# Patient Record
Sex: FEMALE | Race: WHITE | NOT HISPANIC OR LATINO | Employment: FULL TIME | URBAN - METROPOLITAN AREA
[De-identification: names, ages, dates, MRNs, and addresses within clinical notes are randomized per-mention and may not be internally consistent; named-entity substitution may affect disease eponyms.]

---

## 2018-10-24 ENCOUNTER — OFFICE VISIT CONVERTED (OUTPATIENT)
Dept: SURGERY | Facility: CLINIC | Age: 58
End: 2018-10-24
Attending: NURSE PRACTITIONER

## 2018-10-24 ENCOUNTER — CONVERSION ENCOUNTER (OUTPATIENT)
Dept: SURGERY | Facility: CLINIC | Age: 58
End: 2018-10-24

## 2019-02-01 ENCOUNTER — HOSPITAL ENCOUNTER (OUTPATIENT)
Dept: SURGERY | Facility: HOSPITAL | Age: 59
Setting detail: HOSPITAL OUTPATIENT SURGERY
Discharge: HOME | End: 2019-02-01
Attending: SURGERY

## 2019-07-15 ENCOUNTER — HOSPITAL ENCOUNTER (OUTPATIENT)
Dept: OTHER | Facility: HOSPITAL | Age: 59
Discharge: HOME | End: 2019-07-15

## 2019-07-24 ENCOUNTER — HOSPITAL ENCOUNTER (OUTPATIENT)
Dept: OTHER | Facility: HOSPITAL | Age: 59
Discharge: HOME | End: 2019-07-24

## 2021-03-19 ENCOUNTER — HOSPITAL ENCOUNTER (OUTPATIENT)
Dept: VACCINE CLINIC | Facility: HOSPITAL | Age: 61
Discharge: HOME | End: 2021-03-19
Attending: INTERNAL MEDICINE

## 2021-04-16 ENCOUNTER — HOSPITAL ENCOUNTER (OUTPATIENT)
Dept: VACCINE CLINIC | Facility: HOSPITAL | Age: 61
Discharge: HOME | End: 2021-04-16
Attending: INTERNAL MEDICINE

## 2021-05-16 VITALS — HEIGHT: 63 IN | RESPIRATION RATE: 14 BRPM | BODY MASS INDEX: 18.87 KG/M2 | WEIGHT: 106.5 LBS

## 2021-08-02 PROCEDURE — U0003 INFECTIOUS AGENT DETECTION BY NUCLEIC ACID (DNA OR RNA); SEVERE ACUTE RESPIRATORY SYNDROME CORONAVIRUS 2 (SARS-COV-2) (CORONAVIRUS DISEASE [COVID-19]), AMPLIFIED PROBE TECHNIQUE, MAKING USE OF HIGH THROUGHPUT TECHNOLOGIES AS DESCRIBED BY CMS-2020-01-R: HCPCS | Performed by: NURSE PRACTITIONER

## 2024-05-20 ENCOUNTER — OFFICE VISIT (OUTPATIENT)
Dept: FAMILY MEDICINE CLINIC | Facility: CLINIC | Age: 64
End: 2024-05-20
Payer: COMMERCIAL

## 2024-05-20 ENCOUNTER — HOSPITAL ENCOUNTER (OUTPATIENT)
Dept: GENERAL RADIOLOGY | Facility: HOSPITAL | Age: 64
Discharge: HOME | End: 2024-05-20
Admitting: STUDENT IN AN ORGANIZED HEALTH CARE EDUCATION/TRAINING PROGRAM
Payer: COMMERCIAL

## 2024-05-20 VITALS
OXYGEN SATURATION: 98 % | RESPIRATION RATE: 16 BRPM | SYSTOLIC BLOOD PRESSURE: 124 MMHG | WEIGHT: 110 LBS | TEMPERATURE: 97.7 F | HEART RATE: 71 BPM | HEIGHT: 63 IN | DIASTOLIC BLOOD PRESSURE: 80 MMHG | BODY MASS INDEX: 19.49 KG/M2

## 2024-05-20 DIAGNOSIS — M25.552 LEFT HIP PAIN: Primary | ICD-10-CM

## 2024-05-20 DIAGNOSIS — M25.552 LEFT HIP PAIN: ICD-10-CM

## 2024-05-20 PROCEDURE — 73502 X-RAY EXAM HIP UNI 2-3 VIEWS: CPT

## 2024-05-20 PROCEDURE — 99213 OFFICE O/P EST LOW 20 MIN: CPT | Performed by: STUDENT IN AN ORGANIZED HEALTH CARE EDUCATION/TRAINING PROGRAM

## 2024-05-20 PROCEDURE — 96372 THER/PROPH/DIAG INJ SC/IM: CPT | Performed by: STUDENT IN AN ORGANIZED HEALTH CARE EDUCATION/TRAINING PROGRAM

## 2024-05-20 RX ORDER — KETOROLAC TROMETHAMINE 30 MG/ML
60 INJECTION, SOLUTION INTRAMUSCULAR; INTRAVENOUS ONCE
Status: COMPLETED | OUTPATIENT
Start: 2024-05-20 | End: 2024-05-20

## 2024-05-20 RX ORDER — TRIAMCINOLONE ACETONIDE 40 MG/ML
40 INJECTION, SUSPENSION INTRA-ARTICULAR; INTRAMUSCULAR ONCE
Status: COMPLETED | OUTPATIENT
Start: 2024-05-20 | End: 2024-05-20

## 2024-05-20 RX ORDER — VALACYCLOVIR HYDROCHLORIDE 500 MG/1
1 TABLET, FILM COATED ORAL DAILY
COMMUNITY
Start: 2024-03-17

## 2024-05-20 RX ADMIN — TRIAMCINOLONE ACETONIDE 40 MG: 40 INJECTION, SUSPENSION INTRA-ARTICULAR; INTRAMUSCULAR at 15:19

## 2024-05-20 RX ADMIN — KETOROLAC TROMETHAMINE 60 MG: 30 INJECTION, SOLUTION INTRAMUSCULAR; INTRAVENOUS at 15:17

## 2024-05-20 NOTE — PROGRESS NOTES
"Chief Complaint  Establish Care and Hip Pain (Patient reports left hip pain, it started Thursday 5/16, no particular event just sudden onset of pain. Pain is at a 5/10.)    Subjective      Hip Pain         Romina Hopper is a 64 y.o. female who presents to [facility]   With a past medical history of allergic rhinitis, current smoker has smoked for the past 40 years presents today with her daughter for severe onset left hip pain that began in her left hip on Thursday evening 4 days ago.  She denies any recent injuries falls or recent travel.  She has no other complaints such as nausea or vomiting or fever.      Objective   Vital Signs:   Vitals:    05/20/24 1426   BP: 124/80   BP Location: Left arm   Patient Position: Sitting   Cuff Size: Adult   Pulse: 71   Resp: 16   Temp: 97.7 °F (36.5 °C)   TempSrc: Infrared   SpO2: 98%   Weight: 49.9 kg (110 lb)   Height: 160 cm (63\")   PainSc:   5   PainLoc: Hip     Body mass index is 19.49 kg/m².    Wt Readings from Last 3 Encounters:   05/20/24 49.9 kg (110 lb)   08/02/21 54.4 kg (120 lb)   10/24/18 48.3 kg (106 lb 8 oz)     BP Readings from Last 3 Encounters:   05/20/24 124/80   08/02/21 131/71       Health Maintenance   Topic Date Due    COLORECTAL CANCER SCREENING  Never done    Pneumococcal Vaccine 0-64 (1 of 2 - PCV) Never done    TDAP/TD VACCINES (1 - Tdap) Never done    ZOSTER VACCINE (1 of 2) Never done    MAMMOGRAM  07/15/2021    RSV Vaccine - Adults (1 - 1-dose 60+ series) Never done    COVID-19 Vaccine (4 - 2023-24 season) 09/01/2023    HEPATITIS C SCREENING  Never done    ANNUAL PHYSICAL  Never done    PAP SMEAR  Never done    INFLUENZA VACCINE  08/01/2024       Physical Exam  Constitutional:       Appearance: Normal appearance.   HENT:      Head: Normocephalic.      Nose: Nose normal.      Mouth/Throat:      Mouth: Mucous membranes are moist.   Eyes:      Pupils: Pupils are equal, round, and reactive to light.   Cardiovascular: "      Rate and Rhythm: Normal rate and regular rhythm.   Pulmonary:      Effort: Pulmonary effort is normal.   Abdominal:      General: Abdomen is flat.   Musculoskeletal:         General: Normal range of motion.      Cervical back: Normal range of motion.   Skin:     General: Skin is warm and dry.   Neurological:      General: No focal deficit present.      Mental Status: She is alert.   Psychiatric:         Mood and Affect: Mood normal.         Thought Content: Thought content normal.          Result Review :     The following data was reviewed by: Lily Sargent MD on 05/20/2024:      Procedures          Diagnoses and all orders for this visit:    1. Left hip pain (Primary)  -     XR Hip With or Without Pelvis 2 - 3 View Left; Future  -     ketorolac (TORADOL) injection 60 mg  -     triamcinolone acetonide (KENALOG-40) injection 40 mg  -     diclofenac (VOLTAREN) 50 MG EC tablet; Take 1 tablet by mouth 2 (Two) Times a Day for 30 days.  Dispense: 60 tablet; Refill: 0  -     Diclofenac Sodium (Voltaren) 1 % gel gel; Apply 4 g topically to the appropriate area as directed 4 (Four) Times a Day As Needed (hand pain).  Dispense: 100 g; Refill: 1       Patient has not been to a doctor's office in a while we will go ahead and order x-rays today we will see patient back in 2 weeks for follow-up.  Heart diclofenac 50 mg tablets do not take ibuprofen with this patient understands not to take NSAIDs will start them tomorrow since she is getting Toradol today.  BMI is within normal parameters. No other follow-up for BMI required.         FOLLOW UP  Return in about 4 weeks (around 6/17/2024).  Patient was given instructions and counseling regarding her condition or for health maintenance advice. Please see specific information pulled into the AVS if appropriate.       Lily Sargent MD  05/20/24  15:27 EDT    CURRENT & DISCONTINUED MEDICATIONS  Current Outpatient Medications   Medication Instructions    diclofenac (VOLTAREN) 50  mg, Oral, 2 Times Daily    Diclofenac Sodium (VOLTAREN) 4 g, Topical, 4 Times Daily PRN    loratadine (Claritin) 10 MG tablet Claritin 10 mg oral tablet take 1 tablet (10 mg) by oral route once daily   Active    valACYclovir (VALTREX) 500 MG tablet 1 tablet, Oral, Daily       There are no discontinued medications.

## 2024-05-23 ENCOUNTER — TELEPHONE (OUTPATIENT)
Dept: FAMILY MEDICINE CLINIC | Facility: CLINIC | Age: 64
End: 2024-05-23

## 2024-05-23 NOTE — TELEPHONE ENCOUNTER
Caller: Romina Hopper    Relationship to patient: Self    Best call back number:    [phone] (Home)       Patient is needing: PATIENT CALLED IN AND IS REQUESTING A CALL BACK FROM DR. BEATTY. PATIENT SAID MEDICATION SHE WAS PRESCRIBED IS NOT WORKING AND SHE IS REQUESTING A CALL BACK WITH GUIDANCE. PATIENT LAST SEEN ON 5/20/24

## 2024-06-11 ENCOUNTER — LAB (OUTPATIENT)
Dept: LAB | Facility: HOSPITAL | Age: 64
End: 2024-06-11
Payer: COMMERCIAL

## 2024-06-11 ENCOUNTER — OFFICE VISIT (OUTPATIENT)
Dept: FAMILY MEDICINE CLINIC | Facility: CLINIC | Age: 64
End: 2024-06-11
Payer: COMMERCIAL

## 2024-06-11 ENCOUNTER — TRANSCRIBE ORDERS (OUTPATIENT)
Dept: ADMINISTRATIVE | Facility: HOSPITAL | Age: 64
End: 2024-06-11
Payer: COMMERCIAL

## 2024-06-11 VITALS
OXYGEN SATURATION: 99 % | DIASTOLIC BLOOD PRESSURE: 82 MMHG | SYSTOLIC BLOOD PRESSURE: 137 MMHG | HEART RATE: 76 BPM | BODY MASS INDEX: 19.14 KG/M2 | WEIGHT: 108 LBS | HEIGHT: 63 IN

## 2024-06-11 DIAGNOSIS — Z12.2 ENCOUNTER FOR SCREENING FOR LUNG CANCER: ICD-10-CM

## 2024-06-11 DIAGNOSIS — Z12.31 SCREENING MAMMOGRAM FOR BREAST CANCER: ICD-10-CM

## 2024-06-11 DIAGNOSIS — Z12.31 BREAST CANCER SCREENING BY MAMMOGRAM: Primary | ICD-10-CM

## 2024-06-11 DIAGNOSIS — M25.552 GREATER TROCHANTERIC PAIN SYNDROME OF LEFT LOWER EXTREMITY: ICD-10-CM

## 2024-06-11 DIAGNOSIS — Z12.11 COLON CANCER SCREENING: ICD-10-CM

## 2024-06-11 DIAGNOSIS — Z00.00 ENCOUNTER FOR MEDICAL EXAMINATION TO ESTABLISH CARE: Primary | ICD-10-CM

## 2024-06-11 DIAGNOSIS — B00.1 HERPES LABIALIS: ICD-10-CM

## 2024-06-11 DIAGNOSIS — Z00.00 ENCOUNTER FOR MEDICAL EXAMINATION TO ESTABLISH CARE: ICD-10-CM

## 2024-06-11 LAB
ALBUMIN SERPL-MCNC: 4.2 G/DL (ref 3.5–5.2)
ALBUMIN/GLOB SERPL: 1.8 G/DL
ALP SERPL-CCNC: 61 U/L (ref 39–117)
ALT SERPL W P-5'-P-CCNC: 13 U/L (ref 1–33)
ANION GAP SERPL CALCULATED.3IONS-SCNC: 8.7 MMOL/L (ref 5–15)
AST SERPL-CCNC: 15 U/L (ref 1–32)
BILIRUB SERPL-MCNC: 0.2 MG/DL (ref 0–1.2)
BUN SERPL-MCNC: 21 MG/DL (ref 8–23)
BUN/CREAT SERPL: 30.4 (ref 7–25)
CALCIUM SPEC-SCNC: 9.7 MG/DL (ref 8.6–10.5)
CHLORIDE SERPL-SCNC: 107 MMOL/L (ref 98–107)
CHOLEST SERPL-MCNC: 162 MG/DL (ref 0–200)
CO2 SERPL-SCNC: 25.3 MMOL/L (ref 22–29)
CREAT SERPL-MCNC: 0.69 MG/DL (ref 0.57–1)
DEPRECATED RDW RBC AUTO: 44.5 FL (ref 37–54)
EGFRCR SERPLBLD CKD-EPI 2021: 98.3 ML/MIN/1.73
ERYTHROCYTE [DISTWIDTH] IN BLOOD BY AUTOMATED COUNT: 12.3 % (ref 12.3–15.4)
GLOBULIN UR ELPH-MCNC: 2.4 GM/DL
GLUCOSE SERPL-MCNC: 95 MG/DL (ref 65–99)
HCT VFR BLD AUTO: 39.6 % (ref 34–46.6)
HCV AB SER QL: NORMAL
HDLC SERPL-MCNC: 60 MG/DL (ref 40–60)
HGB BLD-MCNC: 13.6 G/DL (ref 12–15.9)
LDLC SERPL CALC-MCNC: 87 MG/DL (ref 0–100)
LDLC/HDLC SERPL: 1.43 {RATIO}
MCH RBC QN AUTO: 34.2 PG (ref 26.6–33)
MCHC RBC AUTO-ENTMCNC: 34.3 G/DL (ref 31.5–35.7)
MCV RBC AUTO: 99.5 FL (ref 79–97)
PLATELET # BLD AUTO: 238 10*3/MM3 (ref 140–450)
PMV BLD AUTO: 9.2 FL (ref 6–12)
POTASSIUM SERPL-SCNC: 4.2 MMOL/L (ref 3.5–5.2)
PROT SERPL-MCNC: 6.6 G/DL (ref 6–8.5)
RBC # BLD AUTO: 3.98 10*6/MM3 (ref 3.77–5.28)
SODIUM SERPL-SCNC: 141 MMOL/L (ref 136–145)
TRIGL SERPL-MCNC: 81 MG/DL (ref 0–150)
VLDLC SERPL-MCNC: 15 MG/DL (ref 5–40)
WBC NRBC COR # BLD AUTO: 5.42 10*3/MM3 (ref 3.4–10.8)

## 2024-06-11 PROCEDURE — 99214 OFFICE O/P EST MOD 30 MIN: CPT | Performed by: STUDENT IN AN ORGANIZED HEALTH CARE EDUCATION/TRAINING PROGRAM

## 2024-06-11 PROCEDURE — 80061 LIPID PANEL: CPT

## 2024-06-11 PROCEDURE — 85027 COMPLETE CBC AUTOMATED: CPT

## 2024-06-11 PROCEDURE — 36415 COLL VENOUS BLD VENIPUNCTURE: CPT

## 2024-06-11 PROCEDURE — 80053 COMPREHEN METABOLIC PANEL: CPT

## 2024-06-11 PROCEDURE — 86803 HEPATITIS C AB TEST: CPT

## 2024-06-11 RX ORDER — BACILLUS COAGULANS/INULIN 1B-250 MG
50 CAPSULE ORAL DAILY
COMMUNITY

## 2024-06-11 RX ORDER — VALACYCLOVIR HYDROCHLORIDE 500 MG/1
500 TABLET, FILM COATED ORAL DAILY
Qty: 90 TABLET | Refills: 1 | Status: SHIPPED | OUTPATIENT
Start: 2024-06-11

## 2024-06-11 RX ORDER — MELOXICAM 15 MG/1
15 TABLET ORAL DAILY
Qty: 30 TABLET | Refills: 0 | Status: SHIPPED | OUTPATIENT
Start: 2024-06-11

## 2024-06-11 NOTE — PROGRESS NOTES
Subjective:       Romina Hopper is a 64 y.o. female with a concurrent medical history of current everyday smoking, allergic rhinitis, and herpes labialis who presents for to establish care.     Ms. Hopper is currently a patient of PCP Dr. Cha.  Their first and only visit was this a few weeks ago, on 5/20/2024.  According to that note, she had not been to a physician's office in some time.      At her recent 5/20/2024 visit with Dr. Cha, Ms. Hopper reported left hip pain that started approximately 4 days prior to that appointment.  Per note there were no inciting injuries.  I have reviewed that office note.  X-rays of the hip were obtained and demonstrated mild bilateral hip osteoarthritis.        She was given a Toradol and Kenalog injection and started on Voltaren and Voltaren gel. She was scheduled for 4-week follow-up.    Today, patient tells me she continues to have pain but it has somewhat improved.  She has been using ibuprofen rather than the Voltaren that was previously prescribed.  She requests further workup.    We discussed that the initial approach to the evaluation of hip pain is based on localization.  Her pain is more lateral in nature it was not associated with injury.  She does have some pain sleeping on the affected hip.  Sitting for long periods of time and physical activity can aggravate her pain.  On physical exam, she does not have any restriction by pain of range of motion of the affected hip.  She does have potentially some slight tenderness to palpation over the lateral hip and greater trochanter.    Symptoms for me are more consistent with greater trochanteric pain syndrome.  Etiology for this can include conditions such as gluteus medius tendinopathy or muscle tear or bursitis.  Less likely to be bursitis based on her current history and symptoms.    I would have her stop ibuprofen and Voltaren (she is not taking Voltaren currently) and start her on Mobic.  I would set  her up for physical therapy and obtain MRI of the affected hip to assess for gluteus medius tear.  Because the symptoms do bother her significantly, I will go ahead and refer her to orthopedic surgery as current evidence demonstrates that in patients with labral tears, gluteus medius tendon tears there are usually good surgical outcomes and thus on this basis early referral has been shown to improve patient outcomes.      The following portions of the patient's history were reviewed and updated as appropriate: allergies, current medications, past family history, past medical history, past social history, past surgical history, and problem list.    Past Medical Hx:  Past Medical History:   Diagnosis Date    Allergic 09/1997       Past Surgical Hx:  Past Surgical History:   Procedure Laterality Date    TUBAL ABDOMINAL LIGATION         Current Meds:    Current Outpatient Medications:     Bacillus Coagulans-Inulin (Probiotic) 1-250 BILLION-MG capsule, Take 50 Billion Cells by mouth Daily., Disp: , Rfl:     Diclofenac Sodium (Voltaren) 1 % gel gel, Apply 4 g topically to the appropriate area as directed 4 (Four) Times a Day As Needed (hand pain)., Disp: 100 g, Rfl: 1    loratadine (Claritin) 10 MG tablet, Claritin 10 mg oral tablet take 1 tablet (10 mg) by oral route once daily   Active, Disp: , Rfl:     valACYclovir (VALTREX) 500 MG tablet, Take 1 tablet by mouth Daily., Disp: 90 tablet, Rfl: 1    meloxicam (Mobic) 15 MG tablet, Take 1 tablet by mouth Daily., Disp: 30 tablet, Rfl: 0    Allergies:  Allergies   Allergen Reactions    Hydrocodone GI Intolerance and Nausea And Vomiting       Family Hx:  Family History   Problem Relation Age of Onset    Arthritis Mother     Diabetes Mother     Hyperlipidemia Mother     Heart disease Father     Hyperlipidemia Father     Diabetes Maternal Grandfather     Stroke Maternal Grandfather     Diabetes Maternal Grandmother     Cancer Brother     Cancer Paternal Aunt     COPD Sister      "    Social History:  Social History     Socioeconomic History    Marital status:    Tobacco Use    Smoking status: Every Day     Current packs/day: 1.00     Average packs/day: 1 pack/day for 50.0 years (50.0 ttl pk-yrs)     Types: Cigarettes    Smokeless tobacco: Never   Vaping Use    Vaping status: Never Used   Substance and Sexual Activity    Alcohol use: Not Currently     Alcohol/week: 1.0 standard drink of alcohol     Types: 1 Glasses of wine per week     Comment: Haven’t really drank alcohol since about 2020    Drug use: Never    Sexual activity: Not Currently     Partners: Male     Birth control/protection: Tubal ligation       Review of Systems  Review of Systems   Musculoskeletal:  Positive for arthralgias (left hip pain).       Objective:     /82   Pulse 76   Ht 160 cm (63\")   Wt 49 kg (108 lb)   SpO2 99%   BMI 19.13 kg/m²   Physical Exam  Constitutional:       General: She is not in acute distress.     Appearance: Normal appearance. She is normal weight. She is not ill-appearing, toxic-appearing or diaphoretic.   Pulmonary:      Effort: Pulmonary effort is normal. No respiratory distress.   Musculoskeletal:      Comments: Details of musculoskeletal exam of left hip included in body of note   Neurological:      Mental Status: She is alert.   Psychiatric:         Mood and Affect: Mood normal.         Behavior: Behavior normal.          Assessment/Plan:     Diagnoses and all orders for this visit:    1. Encounter for medical examination to establish care (Primary)  -     Lipid panel; Future  -     CBC No Differential; Future  -     Hepatitis C antibody; Future  -     Comprehensive metabolic panel; Future    2. Greater trochanteric pain syndrome of left lower extremity      Ms. Hopper is currently a patient of PCP Dr. Cha.  Their first and only visit was this a few weeks ago, on 5/20/2024.  According to that note, she had not been to a physician's office in some time.      At her " recent 5/20/2024 visit with Dr. Cha, Ms. Hopper reported left hip pain that started approximately 4 days prior to that appointment.  Per note there were no inciting injuries.  I have reviewed that office note.  X-rays of the hip were obtained and demonstrated mild bilateral hip osteoarthritis.        She was given a Toradol and Kenalog injection and started on Voltaren and Voltaren gel. She was scheduled for 4-week follow-up.    Today, patient tells me she continues to have pain but it has somewhat improved.  She has been using ibuprofen rather than the Voltaren that was previously prescribed.  She requests further workup.    We discussed that the initial approach to the evaluation of hip pain is based on localization.  Her pain is more lateral in nature it was not associated with injury.  She does have some pain sleeping on the affected hip.  Sitting for long periods of time and physical activity can aggravate her pain.  On physical exam, she does not have any restriction by pain of range of motion of the affected hip.  She does have potentially some slight tenderness to palpation over the lateral hip and greater trochanter.    Symptoms for me are more consistent with greater trochanteric pain syndrome.  Etiology for this can include conditions such as gluteus medius tendinopathy or muscle tear or bursitis.  Less likely to be bursitis based on her current history and symptoms.    I would have her stop ibuprofen and Voltaren (she is not taking Voltaren currently) and start her on Mobic.  I would set her up for physical therapy and obtain MRI of the affected hip to assess for gluteus medius tear.  Because the symptoms do bother her significantly, I will go ahead and refer her to orthopedic surgery as current evidence demonstrates that in patients with labral tears, gluteus medius tendon tears there are usually good surgical outcomes and thus on this basis early referral has been shown to improve patient  outcomes.      -     MRI Hip Right Without Contrast; Future  -     Ambulatory Referral to Orthopedic Surgery  -     meloxicam (Mobic) 15 MG tablet; Take 1 tablet by mouth Daily.  Dispense: 30 tablet; Refill: 0  -     Ambulatory Referral to Physical Therapy    3. Colon cancer screening    Care gaps in chart of colon cancer screening.  Will order Cologuard for her today.    -     Cologuard - Stool, Per Rectum; Future    4. Screening mammogram for breast cancer    She is overdue for a screening mammogram.  She did tell me that she has been told in the past she has dense breasts and would need 3D imaging.  I have put specific instructions to the imaging center to let me know if they require additional orders for me.    -     Mammo Screening Bilateral With CAD; Future    5. Encounter for screening for lung cancer      She smokes 1 pack of cigarettes a day and has smoked for 50 years.  She would be indicated for a lung cancer CT screening.  She agrees to receive this today.  I have ordered this for her.  I did recommend cessation but it appears to me in my judgment she is in the precontemplation stage.      -     CT Chest Low Dose Wo; Future    6. Herpes labialis    She takes daily Valtrex for suppression of herpes labialis.  Will refill at her request but do want to recheck renal function with CMP and I have ordered this.    -     valACYclovir (VALTREX) 500 MG tablet; Take 1 tablet by mouth Daily.  Dispense: 90 tablet; Refill: 1          Rx changes: Discontinuing existing NSAIDs and sending in Mobic    Follow-up:     Return in about 6 weeks (around 7/23/2024) for Pap With Sherie .    Preventative:  Health Maintenance   Topic Date Due    COLORECTAL CANCER SCREENING  Never done    LUNG CANCER SCREENING  Never done    MAMMOGRAM  07/15/2021    HEPATITIS C SCREENING  Never done    PAP SMEAR  Never done    COVID-19 Vaccine (4 - 2023-24 season) 06/12/2024 (Originally 9/1/2023)    RSV Vaccine - Adults (1 - 1-dose 60+ series)  06/12/2024 (Originally 1/9/2022)    Pneumococcal Vaccine 0-64 (1 of 2 - PCV) 06/12/2024 (Originally [birth year]9/1968)    TDAP/TD VACCINES (1 - Tdap) 06/12/2024 (Originally 1/9/1981)    ZOSTER VACCINE (1 of 2) 06/12/2024 (Originally 1/9/2012)    INFLUENZA VACCINE  08/01/2024    ANNUAL PHYSICAL  06/11/2025       This document has been electronically signed by Jose F Villarreal MD on June 11, 2024 11:06 EDT       Parts of this note are electronic transcriptions/translations of spoken language to printed text using the Dragon Dictation system.

## 2024-06-12 ENCOUNTER — TELEPHONE (OUTPATIENT)
Dept: FAMILY MEDICINE CLINIC | Facility: CLINIC | Age: 64
End: 2024-06-12
Payer: COMMERCIAL

## 2024-06-12 NOTE — TELEPHONE ENCOUNTER
Caller: Romina Hopper    Relationship to patient: Self    Best call back number: [phone]    Chief complaint: PATIENT IS NEEDING PAP SMEAR ONLY WITH [facility]     Type of visit: PHYSICAL WITH PAP     Requested date: A MORNING

## 2024-06-12 NOTE — TELEPHONE ENCOUNTER
HUB TO RELAY AND SCHEDULE      1ST ATTEMPT TO CONTACT. Called patient to schedule a physical with pap. No answer, left vm

## 2024-06-12 NOTE — TELEPHONE ENCOUNTER
Caller: WardRomina    Relationship: Self    Best call back number: [phone]    Additional notes: THIS MRI ORDER WAS FOR THE RIGHT HIP, WHEN IT SHOULD BE THE LEFT HIP. SHE WOULD LIKE A CALL WHEN CORRECTED

## 2024-06-13 ENCOUNTER — OFFICE VISIT (OUTPATIENT)
Dept: ORTHOPEDIC SURGERY | Facility: CLINIC | Age: 64
End: 2024-06-13
Payer: COMMERCIAL

## 2024-06-13 VITALS
OXYGEN SATURATION: 96 % | DIASTOLIC BLOOD PRESSURE: 81 MMHG | SYSTOLIC BLOOD PRESSURE: 126 MMHG | HEART RATE: 73 BPM | HEIGHT: 63 IN | WEIGHT: 108 LBS | BODY MASS INDEX: 19.14 KG/M2

## 2024-06-13 DIAGNOSIS — M25.552 LEFT HIP PAIN: Primary | ICD-10-CM

## 2024-06-13 DIAGNOSIS — M25.552 GREATER TROCHANTERIC PAIN SYNDROME OF LEFT LOWER EXTREMITY: ICD-10-CM

## 2024-06-13 DIAGNOSIS — Z91.89 AT RISK FOR HEART DISEASE: ICD-10-CM

## 2024-06-13 DIAGNOSIS — M70.62 TROCHANTERIC BURSITIS OF LEFT HIP: ICD-10-CM

## 2024-06-13 DIAGNOSIS — M62.838 NIGHT MUSCLE SPASMS: Primary | ICD-10-CM

## 2024-06-13 RX ORDER — ATORVASTATIN CALCIUM 10 MG/1
10 TABLET, FILM COATED ORAL DAILY
Qty: 90 TABLET | Refills: 1 | Status: SHIPPED | OUTPATIENT
Start: 2024-06-13

## 2024-06-13 RX ORDER — TIZANIDINE 2 MG/1
2 TABLET ORAL NIGHTLY PRN
Qty: 30 TABLET | Refills: 1 | Status: SHIPPED | OUTPATIENT
Start: 2024-06-13

## 2024-06-13 NOTE — PROGRESS NOTES
I have reviewed Ms. Guaman's lab work.  CMP shows normal liver and renal function and electrolytes.  CBC shows normal white blood cells, red blood cells, and platelets.  Hepatitis C is nonreactive.  Total cholesterol, LDL cholesterol, and HDL cholesterol are all normal.  However, because she is a smoker, guidelines would still recommend starting her on a statin to lower her risk of stroke and heart attack if she is agreeable.  Can we assess her for this?  Common side effects of statins include lower extremity muscle aches.  With her having hip pain, it would be reasonable for her to defer starting treatment for now. Sometimes patients will have some elevation in liver enzymes but again her liver enzymes are normal.    Thank you,    Jose F Villarreal    ?  This document has been electronically signed by Jose F Villarreal MD on June 13, 2024 09:09 EDT

## 2024-06-13 NOTE — PROGRESS NOTES
"Chief Complaint  Initial Evaluation of the Left Hip     Subjective      Romina Hopper presents to [facility] for initial evaluation of the left hip. She has had for about a month on the lateral aspect of the hip and the upper thigh.  She was given steroid and Toradol injection that gave no relief.  She was put on anti inflammatory that gave little relief.  She had no injury or fall.  She has pain with transition from sit to stand, stairs and prolonged standing.  She took a week off work and rested and notes she is feeling better.     Allergies   Allergen Reactions    Hydrocodone GI Intolerance and Nausea And Vomiting        Social History     Socioeconomic History    Marital status:    Tobacco Use    Smoking status: Every Day     Current packs/day: 1.00     Average packs/day: 1 pack/day for 50.4 years (50.4 ttl pk-yrs)     Types: Cigarettes     Start date: 1974    Smokeless tobacco: Never   Vaping Use    Vaping status: Never Used   Substance and Sexual Activity    Alcohol use: Not Currently     Alcohol/week: 1.0 standard drink of alcohol     Types: 1 Glasses of wine per week     Comment: Haven’t really drank alcohol since about 2020    Drug use: Never    Sexual activity: Not Currently     Partners: Male     Birth control/protection: Tubal ligation        I reviewed the patient's chief complaint, history of present illness, review of systems, past medical history, surgical history, family history, social history, medications, and allergy list.     Review of Systems     Constitutional: Denies fevers, chills, weight loss  Cardiovascular: Denies chest pain, shortness of breath  Skin: Denies rashes, acute skin changes  Neurologic: Denies headache, loss of consciousness        Vital Signs:   /81   Pulse 73   Ht 160 cm (62.99\")   Wt 49 kg (108 lb)   SpO2 96%   BMI 19.14 kg/m²          Physical Exam  General: Alert. No acute distress    Ortho Exam        LEFT HIP Hip No " skin discoloration, atrophy or swelling. Positive EHL, FHL, GS, and TA. Sensation intact to all 5 nerves of the foot. Negative straight leg raise. Leg lengths appear equal. Ambulates with Non-antalgic gait Negative Real. Negative Valentino. Good strength to hamstrings, quadriceps, dorsiflexors, and plantar flexors. Knee Extensor Mechanism  intact        Procedures      Imaging Results (Most Recent)       None             Result Review :       XR Hip With or Without Pelvis 2 - 3 View Left    Result Date: 5/21/2024  Narrative: XR HIP W OR WO PELVIS 2-3 VIEW LEFT-  Date of Exam: 5/20/2024 4:19 PM  Indication: lef thip pain; M25.552-Pain in left hip  Comparison: None available.  Findings: No fracture or malalignment is identified. Mild bilateral hip osteoarthritis is noted. Soft tissues appear normal.      Impression: Impression: 1.  Mild bilateral hip osteoarthritis   Electronically Signed By-Lokesh Thomas MD On:5/21/2024 7:28 AM              Assessment and Plan     Diagnoses and all orders for this visit:    1. Left hip pain (Primary)    2. Trochanteric bursitis of left hip        Discussed the treatment plan with the patient. I reviewed the X-rays that were obtained 5/21/24 with the patient.     Continue anti inflammatory.  Given HEP exercises.     Educated on risk of smoking/nicotine. Discussed options for smoking cessation. and Call or return if worsening symptoms.    Follow Up     PRN      Patient was given instructions and counseling regarding her condition or for health maintenance advice. Please see specific information pulled into the AVS if appropriate.     Scribed for Holly Avila MD by Kathryn Villafana MA.  06/13/24   11:05 EDT    I have personally performed the services described in this document as scribed by the above individual and it is both accurate and complete. Holly Avila MD 06/13/24

## 2024-06-13 NOTE — TELEPHONE ENCOUNTER
Please let patient know that the correct order has been placed, and can we cancel the other MRI order?    Thank you,    Jose F Villarreal      This document has been electronically signed by Jose F Villarreal MD on June 13, 2024 08:53 EDT

## 2024-06-13 NOTE — PROGRESS NOTES
Very well.  In that case, I have sent in a prescription for Lipitor 10 mg.    I have also sent in a very low-dose muscle relaxer of Zanaflex 2 mg.  Please let the patient know that this can make her sleepy and so she should not take this medication before operating any heavy machinery or driving.  Please also let her know that Valtrex that she takes can increase the dose of Zanaflex in her system so I am sending her in the very lowest dose possible to minimize that risk but if she feels oversedated to please let me know and I will discontinue that medication.    Thank you,    Jose F Villarreal    ?  This document has been electronically signed by Jose F Villarreal MD on June 13, 2024 11:22 EDT

## 2024-07-08 DIAGNOSIS — M25.552 GREATER TROCHANTERIC PAIN SYNDROME OF LEFT LOWER EXTREMITY: ICD-10-CM

## 2024-07-08 RX ORDER — MELOXICAM 15 MG/1
15 TABLET ORAL DAILY
Qty: 30 TABLET | Refills: 0 | Status: SHIPPED | OUTPATIENT
Start: 2024-07-08

## 2024-07-09 DIAGNOSIS — M62.838 NIGHT MUSCLE SPASMS: ICD-10-CM

## 2024-07-09 RX ORDER — TIZANIDINE 2 MG/1
2 TABLET ORAL NIGHTLY PRN
Qty: 30 TABLET | Refills: 1 | Status: SHIPPED | OUTPATIENT
Start: 2024-07-09

## 2024-07-19 ENCOUNTER — HOSPITAL ENCOUNTER (OUTPATIENT)
Dept: MAMMOGRAPHY | Facility: HOSPITAL | Age: 64
Discharge: HOME | End: 2024-07-19
Payer: COMMERCIAL

## 2024-07-19 ENCOUNTER — HOSPITAL ENCOUNTER (OUTPATIENT)
Dept: CT IMAGING | Facility: HOSPITAL | Age: 64
Discharge: HOME | End: 2024-07-19
Payer: COMMERCIAL

## 2024-07-19 DIAGNOSIS — Z12.31 BREAST CANCER SCREENING BY MAMMOGRAM: ICD-10-CM

## 2024-07-19 DIAGNOSIS — Z12.2 ENCOUNTER FOR SCREENING FOR LUNG CANCER: ICD-10-CM

## 2024-07-19 PROCEDURE — 71271 CT THORAX LUNG CANCER SCR C-: CPT

## 2024-07-19 PROCEDURE — 77067 SCR MAMMO BI INCL CAD: CPT

## 2024-07-19 PROCEDURE — 77063 BREAST TOMOSYNTHESIS BI: CPT

## 2024-07-22 ENCOUNTER — TELEPHONE (OUTPATIENT)
Dept: FAMILY MEDICINE CLINIC | Facility: CLINIC | Age: 64
End: 2024-07-22
Payer: COMMERCIAL

## 2024-07-22 NOTE — PROGRESS NOTES
Screening mammogram negative. Can we let her know?    Thank you,    Jose F Villarreal     ?  This document has been electronically signed by Jose F Villarreal MD on July 22, 2024 13:48 EDT

## 2024-07-22 NOTE — TELEPHONE ENCOUNTER
OK FOR HUB TO RELAY    Called pt, [email or web address].    Screening mammogram negative. Can we let her know?    Thank you,    Jose F iVllarreal     ?  This document has been electronically signed by Jose F Villarreal MD on July 22, 2024 13:48 EDT

## 2024-07-23 ENCOUNTER — TELEPHONE (OUTPATIENT)
Dept: FAMILY MEDICINE CLINIC | Facility: CLINIC | Age: 64
End: 2024-07-23
Payer: COMMERCIAL

## 2024-07-23 ENCOUNTER — OFFICE VISIT (OUTPATIENT)
Dept: FAMILY MEDICINE CLINIC | Facility: CLINIC | Age: 64
End: 2024-07-23
Payer: COMMERCIAL

## 2024-07-23 VITALS
DIASTOLIC BLOOD PRESSURE: 75 MMHG | HEIGHT: 63 IN | SYSTOLIC BLOOD PRESSURE: 118 MMHG | HEART RATE: 71 BPM | BODY MASS INDEX: 20.41 KG/M2 | WEIGHT: 115.2 LBS

## 2024-07-23 DIAGNOSIS — Z23 NEED FOR PNEUMOCOCCAL VACCINATION: ICD-10-CM

## 2024-07-23 DIAGNOSIS — Z01.419 WELL WOMAN EXAM WITH ROUTINE GYNECOLOGICAL EXAM: Primary | ICD-10-CM

## 2024-07-23 DIAGNOSIS — R31.9 HEMATURIA, UNSPECIFIED TYPE: ICD-10-CM

## 2024-07-23 DIAGNOSIS — Z23 NEED FOR SHINGLES VACCINE: ICD-10-CM

## 2024-07-23 LAB
BILIRUB BLD-MCNC: NEGATIVE MG/DL
CLARITY, POC: CLEAR
COLOR UR: ABNORMAL
EXPIRATION DATE: ABNORMAL
GLUCOSE UR STRIP-MCNC: NEGATIVE MG/DL
KETONES UR QL: NEGATIVE
LEUKOCYTE EST, POC: NEGATIVE
Lab: ABNORMAL
NITRITE UR-MCNC: NEGATIVE MG/ML
PH UR: 6 [PH] (ref 5–8)
PROT UR STRIP-MCNC: NEGATIVE MG/DL
RBC # UR STRIP: ABNORMAL /UL
SP GR UR: 1.03 (ref 1–1.03)
UROBILINOGEN UR QL: ABNORMAL

## 2024-07-23 PROCEDURE — 90471 IMMUNIZATION ADMIN: CPT

## 2024-07-23 PROCEDURE — 99396 PREV VISIT EST AGE 40-64: CPT

## 2024-07-23 PROCEDURE — 87624 HPV HI-RISK TYP POOLED RSLT: CPT

## 2024-07-23 PROCEDURE — G0123 SCREEN CERV/VAG THIN LAYER: HCPCS

## 2024-07-23 PROCEDURE — 90750 HZV VACC RECOMBINANT IM: CPT

## 2024-07-23 PROCEDURE — 90677 PCV20 VACCINE IM: CPT

## 2024-07-23 PROCEDURE — 81003 URINALYSIS AUTO W/O SCOPE: CPT

## 2024-07-23 PROCEDURE — 90472 IMMUNIZATION ADMIN EACH ADD: CPT

## 2024-07-23 NOTE — PROGRESS NOTES
Can we let patient know lung cancer CT screen was negative?    Thank you,    Jose F Villarreal    ?  This document has been electronically signed by Jose F Villarreal MD on July 22, 2024 20:50 EDT

## 2024-07-23 NOTE — PROGRESS NOTES
"Subjective   History of Present Illness    Romina Hopper is a 64 y.o. female who presents for annual well woman exam with PAP. She is post menopausal. Her mammograms UTD. She reports a hx of abnormal PAP but unsure of when, states she has had normal PAPs since then. She denies any family     Obstetric History:  OB History          4    Para   4    Term   4            AB        Living             SAB        IAB        Ectopic        Molar        Multiple        Live Births                   Menstrual History:  Menarche Age: 16 years  No LMP recorded. Patient is postmenopausal.  Period Pattern: Regular  Menstrual Flow: Moderate  Menstrual Control: Maxi pad    Sexual History:         No results found for: \"HPVAPTIMA\"    Current contraception: tubal ligation  History of abnormal Pap smear: yes - unsure  ANJELICA exposure in utero: no  Received Gardasil immunization: no  Perform regular self breast exam: no  Family history of uterine or ovarian cancer: no  Family History of cervical cancer: no  Family History of colon cancer/colon polyps: no  Regular self breast exam: no  History of abnormal mammogram: no  Family history of breast cancer: no  History of abnormal lipids: yes - current    The following portions of the patient's history were reviewed and updated as appropriate: allergies, current medications, past family history, past medical history, past social history, past surgical history, and problem list.    Review of Systems    Pertinent items are noted in HPI.     Objective   Physical Exam  Vitals reviewed. Exam conducted with a chaperone present.   Constitutional:       General: She is awake. She is not in acute distress.     Appearance: Normal appearance. She is normal weight. She is not ill-appearing.   HENT:      Head: Normocephalic and atraumatic.   Eyes:      Conjunctiva/sclera: Conjunctivae normal.   Cardiovascular:      Rate and Rhythm: Normal rate and regular rhythm.      Heart sounds: Normal " "heart sounds, S1 normal and S2 normal.   Pulmonary:      Effort: Pulmonary effort is normal.      Breath sounds: Normal breath sounds.   Chest:   Breasts:     Right: Normal. No swelling, mass, nipple discharge, skin change or tenderness.      Left: Normal. No swelling, mass, nipple discharge, skin change or tenderness.   Abdominal:      General: Abdomen is flat. Bowel sounds are normal.      Palpations: Abdomen is soft.      Tenderness: There is no abdominal tenderness.   Genitourinary:     General: Normal vulva.      Exam position: Lithotomy position.      Pubic Area: No rash.       Labia:         Right: No rash, tenderness or lesion.         Left: No rash, tenderness or lesion.       Urethra: No urethral pain, urethral swelling or urethral lesion.      Vagina: Normal. No vaginal discharge.      Cervix: Normal.      Uterus: Normal.       Adnexa: Right adnexa normal and left adnexa normal.      Rectum: Normal.   Musculoskeletal:         General: Normal range of motion.      Cervical back: Normal range of motion.      Right lower leg: No edema.      Left lower leg: No edema.   Skin:     General: Skin is warm.      Findings: No lesion or rash.   Neurological:      General: No focal deficit present.      Mental Status: She is alert and oriented to person, place, and time. Mental status is at baseline.      Motor: Motor function is intact.      Coordination: Coordination is intact.      Gait: Gait is intact.   Psychiatric:         Attention and Perception: Attention and perception normal.         Mood and Affect: Mood and affect normal.         Speech: Speech normal.         Behavior: Behavior normal. Behavior is cooperative.         Thought Content: Thought content normal.         Cognition and Memory: Cognition and memory normal.         Judgment: Judgment normal.         /75 (BP Location: Left arm, Patient Position: Sitting, Cuff Size: Small Adult)   Pulse 71   Ht 160 cm (62.99\")   Wt 52.3 kg (115 lb 3.2 oz) "   BMI 20.41 kg/m²   Wt Readings from Last 3 Encounters:   07/23/24 52.3 kg (115 lb 3.2 oz)   06/13/24 49 kg (108 lb)   06/11/24 49 kg (108 lb)      BP Readings from Last 3 Encounters:   07/23/24 118/75   06/13/24 126/81   06/11/24 137/82        General:   alert, appears stated age, and cooperative   Heart: regular rate and rhythm, S1, S2 normal, no murmur, click, rub or gallop   Lungs: clear to auscultation bilaterally   Abdomen: soft, non-tender, without masses or organomegaly   Breast: inspection negative, no nipple discharge or bleeding, no masses or nodularity palpable   Vulva: normal   Vagina: normal mucosa   Cervix: multiparous appearance   Uterus: normal size   Adnexa: normal adnexa and no mass, fullness, tenderness     Assessment & Plan   Diagnoses and all orders for this visit:    1. Well woman exam with routine gynecological exam (Primary)  -     POCT urinalysis dipstick, automated  -     IGP, Aptima HPV, Rfx 16 / 18,45; Future  -     Pneumococcal Conjugate Vaccine 20-Valent (PCV20)  -     Shingrix Vaccine  -     IGP, Aptima HPV, Rfx 16 / 18,45    2. Need for shingles vaccine  Comments:  given in office  Orders:  -     Shingrix Vaccine    3. Need for pneumococcal vaccination  Comments:  given in office  Orders:  -     Pneumococcal Conjugate Vaccine 20-Valent (PCV20)    4. Hematuria, unspecified type  Comments:  repeat ua with micorscopic in 2 weeks  Orders:  -     Urinalysis With Microscopic - Urine, Clean Catch; Future        All questions answered.  Await pap smear results.  Breast self exam technique reviewed and patient encouraged to perform self-exam monthly.  Discussed healthy lifestyle modifications.  Urinalysis.

## 2024-07-23 NOTE — TELEPHONE ENCOUNTER
OK FOR HUB TO RELAY    Can we let patient know lung cancer CT screen was negative?    Thank you,    Jose F Villarreal    ?  This document has been electronically signed by Jose F Villarreal MD on July 22, 2024 20:50 EDT

## 2024-07-26 LAB
CYTOLOGIST CVX/VAG CYTO: NORMAL
CYTOLOGY CVX/VAG DOC CYTO: NORMAL
CYTOLOGY CVX/VAG DOC THIN PREP: NORMAL
DX ICD CODE: NORMAL
HPV GENOTYPE REFLEX: NORMAL
HPV I/H RISK 4 DNA CVX QL PROBE+SIG AMP: NEGATIVE
Lab: NORMAL
OTHER STN SPEC: NORMAL
STAT OF ADQ CVX/VAG CYTO-IMP: NORMAL

## 2024-07-30 ENCOUNTER — TELEPHONE (OUTPATIENT)
Dept: FAMILY MEDICINE CLINIC | Facility: CLINIC | Age: 64
End: 2024-07-30

## 2024-07-30 ENCOUNTER — TELEPHONE (OUTPATIENT)
Dept: FAMILY MEDICINE CLINIC | Facility: CLINIC | Age: 64
End: 2024-07-30
Payer: COMMERCIAL

## 2024-07-30 NOTE — TELEPHONE ENCOUNTER
Name: Romina Hopper    Relationship: Self    Best Callback Number: [phone]     HUB PROVIDED THE RELAY MESSAGE FROM THE OFFICE   PATIENT VOICED UNDERSTANDING AND HAS NO FURTHER QUESTIONS AT THIS TIME

## 2024-08-06 ENCOUNTER — LAB (OUTPATIENT)
Dept: LAB | Facility: HOSPITAL | Age: 64
End: 2024-08-06
Payer: COMMERCIAL

## 2024-08-06 DIAGNOSIS — R31.9 HEMATURIA, UNSPECIFIED TYPE: ICD-10-CM

## 2024-08-06 LAB
BACTERIA UR QL AUTO: ABNORMAL /HPF
BILIRUB UR QL STRIP: NEGATIVE
CLARITY UR: CLEAR
COLOR UR: YELLOW
GLUCOSE UR STRIP-MCNC: NEGATIVE MG/DL
HGB UR QL STRIP.AUTO: NEGATIVE
HYALINE CASTS UR QL AUTO: ABNORMAL /LPF
KETONES UR QL STRIP: NEGATIVE
LEUKOCYTE ESTERASE UR QL STRIP.AUTO: ABNORMAL
NITRITE UR QL STRIP: NEGATIVE
PH UR STRIP.AUTO: 6.5 [PH] (ref 5–8)
PROT UR QL STRIP: NEGATIVE
RBC # UR STRIP: ABNORMAL /HPF
REF LAB TEST METHOD: ABNORMAL
SP GR UR STRIP: 1.01 (ref 1–1.03)
SQUAMOUS #/AREA URNS HPF: ABNORMAL /HPF
UROBILINOGEN UR QL STRIP: ABNORMAL
WBC # UR STRIP: ABNORMAL /HPF

## 2024-08-06 PROCEDURE — 81001 URINALYSIS AUTO W/SCOPE: CPT

## 2024-08-07 ENCOUNTER — TELEPHONE (OUTPATIENT)
Dept: FAMILY MEDICINE CLINIC | Facility: CLINIC | Age: 64
End: 2024-08-07
Payer: COMMERCIAL

## 2024-08-07 NOTE — TELEPHONE ENCOUNTER
Name: Romina Hopper    Relationship: Self    HUB PROVIDED THE RELAY MESSAGE FROM THE OFFICE   PATIENT VOICED UNDERSTANDING AND HAS NO FURTHER QUESTIONS AT THIS TIME

## 2024-08-07 NOTE — TELEPHONE ENCOUNTER
OK FOR HUB TO RELAY    ----- Message from Sallie Voss sent at 8/7/2024  6:55 AM EDT -----    Repeat urine is negative for any blood

## 2024-12-08 DIAGNOSIS — B00.1 HERPES LABIALIS: ICD-10-CM

## 2024-12-08 DIAGNOSIS — Z91.89 AT RISK FOR HEART DISEASE: ICD-10-CM

## 2024-12-10 RX ORDER — VALACYCLOVIR HYDROCHLORIDE 500 MG/1
500 TABLET, FILM COATED ORAL DAILY
Qty: 90 TABLET | Refills: 1 | Status: SHIPPED | OUTPATIENT
Start: 2024-12-10

## 2024-12-10 RX ORDER — ATORVASTATIN CALCIUM 10 MG/1
10 TABLET, FILM COATED ORAL DAILY
Qty: 90 TABLET | Refills: 1 | Status: SHIPPED | OUTPATIENT
Start: 2024-12-10

## 2024-12-27 ENCOUNTER — OFFICE VISIT (OUTPATIENT)
Dept: FAMILY MEDICINE CLINIC | Facility: CLINIC | Age: 64
End: 2024-12-27
Payer: COMMERCIAL

## 2024-12-27 VITALS
HEIGHT: 63 IN | SYSTOLIC BLOOD PRESSURE: 118 MMHG | BODY MASS INDEX: 19.49 KG/M2 | OXYGEN SATURATION: 100 % | HEART RATE: 81 BPM | WEIGHT: 110 LBS | DIASTOLIC BLOOD PRESSURE: 80 MMHG

## 2024-12-27 DIAGNOSIS — M16.10 HIP ARTHRITIS: ICD-10-CM

## 2024-12-27 DIAGNOSIS — L85.8 KERATOSIS PILARIS: ICD-10-CM

## 2024-12-27 DIAGNOSIS — Z23 NEED FOR SHINGLES VACCINE: Primary | ICD-10-CM

## 2024-12-27 DIAGNOSIS — B35.1 ONYCHOMYCOSIS: ICD-10-CM

## 2024-12-27 PROCEDURE — 90471 IMMUNIZATION ADMIN: CPT | Performed by: STUDENT IN AN ORGANIZED HEALTH CARE EDUCATION/TRAINING PROGRAM

## 2024-12-27 PROCEDURE — 99214 OFFICE O/P EST MOD 30 MIN: CPT | Performed by: STUDENT IN AN ORGANIZED HEALTH CARE EDUCATION/TRAINING PROGRAM

## 2024-12-27 PROCEDURE — 90750 HZV VACC RECOMBINANT IM: CPT | Performed by: STUDENT IN AN ORGANIZED HEALTH CARE EDUCATION/TRAINING PROGRAM

## 2024-12-27 RX ORDER — MAGNESIUM GLUCONATE 30 MG(550)
650 TABLET ORAL DAILY
COMMUNITY

## 2024-12-27 RX ORDER — MELOXICAM 7.5 MG/1
7.5 TABLET ORAL DAILY
Qty: 30 TABLET | Refills: 0 | Status: SHIPPED | OUTPATIENT
Start: 2024-12-27

## 2024-12-27 RX ORDER — CICLOPIROX 80 MG/ML
SOLUTION TOPICAL NIGHTLY
Qty: 6 ML | Refills: 2 | Status: SHIPPED | OUTPATIENT
Start: 2024-12-27

## 2024-12-27 RX ORDER — TRIAMCINOLONE ACETONIDE 1 MG/G
1 OINTMENT TOPICAL 2 TIMES DAILY
Qty: 28 G | Refills: 0 | Status: SHIPPED | OUTPATIENT
Start: 2024-12-27 | End: 2025-01-10

## 2024-12-27 NOTE — PROGRESS NOTES
"    Subjective:       Romina Hopper is a 64 y.o. female with a concurrent medical history of current everyday smoking, allergic rhinitis, herpes labialis, retinal migraines and osteoarthritis of hips who presents for follow-up for arthritis and for discussion of new diagnosis of retinal migraines and keratosis pilaris.    In terms of arthritis, we had previously discussed potential for greater trochanteric pain syndrome and referred her orthopedic surgery.  I prescribed meloxicam and she experiencing if any relief with this medication.  She request refill this medication and I will refill this today. I did discuss if she requires long-term use of medication we consider acid prophylaxis with PPI given smoking history.    She also tells me she was recently diagnosed with retinal migraines by her optometrist.  She says when these episodes occur it affects her vision and can occur on either laterality.  She says it is \"like looking through a kaleidoscope.\"  Symptoms will last for several minutes and then carmencita.  She has no pain with symptoms.  I reviewed guidelines related to regular migraines.  If attacks are infrequent and not painful would not start prophylactic medication at this time.  I did tell her this affects what medications she should be prescribed in the future as I would  her against use of triptans and likely beta-blockers as well.  She expressed understanding.    Finally she presents reporting periodic itchy bumps that appear on her hands.  Physical exam is consistent with Keratosis pilaris.  She has tried most of the conservative therapies with lotions and emollients so I do think it is worth treating with a medium potency corticosteroid.  She will call us and let us know if symptoms fail to improve past 2 weeks.    Additionally, she reports onychomycosis of the toenails.  Will try topical treatment first with recheck in 4 months.    The following portions of the patient's history were reviewed " and updated as appropriate: allergies, current medications, past family history, past medical history, past social history, past surgical history, and problem list.    Past Medical Hx:  Past Medical History:   Diagnosis Date    Allergic 09/1997       Past Surgical Hx:  Past Surgical History:   Procedure Laterality Date    TUBAL ABDOMINAL LIGATION         Current Meds:    Current Outpatient Medications:     atorvastatin (LIPITOR) 10 MG tablet, TAKE 1 TABLET BY MOUTH EVERY DAY, Disp: 90 tablet, Rfl: 1    Bacillus Coagulans-Inulin (Probiotic) 1-250 BILLION-MG capsule, Take 50 Billion Cells by mouth Daily., Disp: , Rfl:     loratadine (Claritin) 10 MG tablet, Claritin 10 mg oral tablet take 1 tablet (10 mg) by oral route once daily   Active, Disp: , Rfl:     potassium gluconate ([employer] Potassium Gluconate) 595 (99 K) MG tablet tablet, Take 650 mg by mouth Daily., Disp: , Rfl:     valACYclovir (VALTREX) 500 MG tablet, TAKE 1 TABLET BY MOUTH EVERY DAY, Disp: 90 tablet, Rfl: 1    ciclopirox (PENLAC) 8 % solution, Apply  topically to the appropriate area as directed Every Night., Disp: 6 mL, Rfl: 2    meloxicam (Mobic) 7.5 MG tablet, Take 1 tablet by mouth Daily., Disp: 30 tablet, Rfl: 0    triamcinolone (KENALOG) 0.1 % ointment, Apply 1 Application topically to the appropriate area as directed 2 (Two) Times a Day for 14 days., Disp: 28 g, Rfl: 0    Allergies:  Allergies   Allergen Reactions    Hydrocodone GI Intolerance and Nausea And Vomiting       Family Hx:  Family History   Problem Relation Age of Onset    Arthritis Mother     Diabetes Mother     Hyperlipidemia Mother     Heart disease Father     Hyperlipidemia Father     Diabetes Maternal Grandfather     Stroke Maternal Grandfather     Diabetes Maternal Grandmother     Cancer Brother     Cancer Paternal Aunt     COPD Sister         Social History:  Social History     Socioeconomic History    Marital status:    Tobacco Use    Smoking status: Every Day     Current  "packs/day: 1.00     Average packs/day: 1 pack/day for 51.0 years (51.0 ttl pk-yrs)     Types: Cigarettes     Start date: 1974     Passive exposure: Current    Smokeless tobacco: Never   Vaping Use    Vaping status: Never Used   Substance and Sexual Activity    Alcohol use: Not Currently     Alcohol/week: 1.0 standard drink of alcohol     Types: 1 Glasses of wine per week     Comment: Haven’t really drank alcohol since about 2020    Drug use: Never    Sexual activity: Not Currently     Partners: Male     Birth control/protection: Tubal ligation       Review of Systems  Review of Systems   Musculoskeletal:  Positive for arthralgias.       Objective:     /80 (BP Location: Right arm, Patient Position: Sitting, Cuff Size: Adult)   Pulse 81   Ht 160 cm (62.99\")   Wt 49.9 kg (110 lb)   SpO2 100%   BMI 19.49 kg/m²   Physical Exam  Constitutional:       General: She is not in acute distress.     Appearance: Normal appearance. She is not ill-appearing, toxic-appearing or diaphoretic.   Pulmonary:      Effort: Pulmonary effort is normal. No respiratory distress.   Neurological:      Mental Status: She is alert.   Psychiatric:         Mood and Affect: Mood normal.         Behavior: Behavior normal.          Assessment/Plan:     Diagnoses and all orders for this visit:    1. Need for shingles vaccine (Primary)  -     Shingrix Vaccine    2. Hip arthritis  In terms of arthritis, we had previously discussed potential for greater trochanteric pain syndrome and referred her orthopedic surgery.  I prescribed meloxicam and she experiencing if any relief with this medication.  She request refill this medication and I will refill this today. I did discuss if she requires long-term use of medication we consider acid prophylaxis with PPI given smoking history.      -     meloxicam (Mobic) 7.5 MG tablet; Take 1 tablet by mouth Daily.  Dispense: 30 tablet; Refill: 0    3. Keratosis pilaris    Finally she presents reporting " periodic itchy bumps that appear on her hands.  Physical exam is consistent with Keratosis pilaris.  She has tried most of the conservative therapies with lotions and emollients so I do think it is worth treating with a medium potency corticosteroid.  She will call us and let us know if symptoms fail to improve past 2 weeks.      -     triamcinolone (KENALOG) 0.1 % ointment; Apply 1 Application topically to the appropriate area as directed 2 (Two) Times a Day for 14 days.  Dispense: 28 g; Refill: 0    4. Onychomycosis    Additionally, she reports onychomycosis of the toenails.  Will try topical treatment first with recheck in 4 months.    -     ciclopirox (PENLAC) 8 % solution; Apply  topically to the appropriate area as directed Every Night.  Dispense: 6 mL; Refill: 2        Follow-up:     Return in about 4 months (around 4/27/2025) for Onychomycosis .    Preventative:  Health Maintenance   Topic Date Due    ANNUAL PHYSICAL  06/11/2025    LUNG CANCER SCREENING  07/19/2025    MAMMOGRAM  07/19/2026    COLORECTAL CANCER SCREENING  06/18/2027    PAP SMEAR  07/23/2027    TDAP/TD VACCINES (2 - Td or Tdap) 02/03/2030    HEPATITIS C SCREENING  Completed    COVID-19 Vaccine  Completed    Pneumococcal Vaccine 0-64  Completed    INFLUENZA VACCINE  Completed    ZOSTER VACCINE  Completed           This document has been electronically signed by Jose F Villarreal MD on December 27, 2024 12:43 EST       Parts of this note are electronic transcriptions/translations of spoken language to printed text using the Dragon Dictation system.

## 2025-01-30 DIAGNOSIS — M16.10 HIP ARTHRITIS: ICD-10-CM

## 2025-01-30 RX ORDER — MELOXICAM 7.5 MG/1
7.5 TABLET ORAL DAILY
Qty: 30 TABLET | Refills: 0 | Status: SHIPPED | OUTPATIENT
Start: 2025-01-30

## 2025-03-01 DIAGNOSIS — M16.10 HIP ARTHRITIS: ICD-10-CM

## 2025-03-03 RX ORDER — MELOXICAM 7.5 MG/1
7.5 TABLET ORAL DAILY
Qty: 30 TABLET | Refills: 0 | Status: SHIPPED | OUTPATIENT
Start: 2025-03-03

## 2025-04-02 DIAGNOSIS — M16.10 HIP ARTHRITIS: ICD-10-CM

## 2025-04-02 DIAGNOSIS — B35.1 ONYCHOMYCOSIS: ICD-10-CM

## 2025-04-02 RX ORDER — MELOXICAM 7.5 MG/1
7.5 TABLET ORAL DAILY
Qty: 30 TABLET | Refills: 0 | Status: SHIPPED | OUTPATIENT
Start: 2025-04-02

## 2025-04-02 RX ORDER — CICLOPIROX 80 MG/ML
SOLUTION TOPICAL NIGHTLY
Qty: 6.6 ML | Refills: 2 | Status: SHIPPED | OUTPATIENT
Start: 2025-04-02

## 2025-04-28 ENCOUNTER — OFFICE VISIT (OUTPATIENT)
Dept: FAMILY MEDICINE CLINIC | Facility: CLINIC | Age: 65
End: 2025-04-28
Payer: COMMERCIAL

## 2025-04-28 ENCOUNTER — LAB (OUTPATIENT)
Dept: LAB | Facility: HOSPITAL | Age: 65
End: 2025-04-28
Payer: COMMERCIAL

## 2025-04-28 VITALS
OXYGEN SATURATION: 100 % | WEIGHT: 108.6 LBS | DIASTOLIC BLOOD PRESSURE: 76 MMHG | HEART RATE: 78 BPM | HEIGHT: 63 IN | SYSTOLIC BLOOD PRESSURE: 107 MMHG | BODY MASS INDEX: 19.24 KG/M2

## 2025-04-28 DIAGNOSIS — Z51.81 MEDICATION MONITORING ENCOUNTER: ICD-10-CM

## 2025-04-28 DIAGNOSIS — Z51.81 MEDICATION MONITORING ENCOUNTER: Primary | ICD-10-CM

## 2025-04-28 DIAGNOSIS — L98.9 SKIN LESION: ICD-10-CM

## 2025-04-28 DIAGNOSIS — M16.10 HIP ARTHRITIS: ICD-10-CM

## 2025-04-28 DIAGNOSIS — B35.1 ONYCHOMYCOSIS: ICD-10-CM

## 2025-04-28 LAB
ALBUMIN SERPL-MCNC: 4.1 G/DL (ref 3.5–5.2)
ALP SERPL-CCNC: 62 U/L (ref 39–117)
ALT SERPL W P-5'-P-CCNC: 20 U/L (ref 1–33)
AST SERPL-CCNC: 20 U/L (ref 1–32)
BILIRUB CONJ SERPL-MCNC: <0.1 MG/DL (ref 0–0.3)
BILIRUB INDIRECT SERPL-MCNC: NORMAL MG/DL
BILIRUB SERPL-MCNC: 0.2 MG/DL (ref 0–1.2)
PROT SERPL-MCNC: 6.4 G/DL (ref 6–8.5)

## 2025-04-28 PROCEDURE — 80076 HEPATIC FUNCTION PANEL: CPT

## 2025-04-28 PROCEDURE — 36415 COLL VENOUS BLD VENIPUNCTURE: CPT

## 2025-04-28 RX ORDER — MELOXICAM 7.5 MG/1
7.5 TABLET ORAL DAILY
Qty: 90 TABLET | Refills: 0 | Status: SHIPPED | OUTPATIENT
Start: 2025-04-28

## 2025-04-28 RX ORDER — MELOXICAM 7.5 MG/1
7.5 TABLET ORAL DAILY
Qty: 30 TABLET | Refills: 0 | Status: SHIPPED | OUTPATIENT
Start: 2025-04-28 | End: 2025-04-28

## 2025-04-28 RX ORDER — OMEPRAZOLE 20 MG/1
20 CAPSULE, DELAYED RELEASE ORAL DAILY
Qty: 90 CAPSULE | Refills: 1 | Status: SHIPPED | OUTPATIENT
Start: 2025-04-28

## 2025-04-28 NOTE — PROGRESS NOTES
Subjective:       Romina Hopper is a 65 y.o. female who presents for follow-up for onychomycosis.      We have previously prescribed topical ciclopirox for onychomycosis of the toenails bilaterally.  I do note some improvement on my exam but patient denies having had significant response to treatment.  She expresses preference to attempt oral medication.  Will start terbinafine 250 mg for approximately 3 months due to location.  However, before starting this medication would want to confirm she still has normal liver enzymes.  Will order hepatic function panel today and anticipate starting medication if this is normal we will follow-up in 3 months.      Past Medical Hx:  Past Medical History:   Diagnosis Date    Allergic 09/1997       Past Surgical Hx:  Past Surgical History:   Procedure Laterality Date    TUBAL ABDOMINAL LIGATION         Current Meds:    Current Outpatient Medications:     atorvastatin (LIPITOR) 10 MG tablet, TAKE 1 TABLET BY MOUTH EVERY DAY, Disp: 90 tablet, Rfl: 1    Bacillus Coagulans-Inulin (Probiotic) 1-250 BILLION-MG capsule, Take 50 Billion Cells by mouth Daily., Disp: , Rfl:     ciclopirox (PENLAC) 8 % solution, APPLY TOPICALLY TO THE APPROPRIATE AREA AS DIRECTED EVERY NIGHT., Disp: 6.6 mL, Rfl: 2    loratadine (Claritin) 10 MG tablet, Claritin 10 mg oral tablet take 1 tablet (10 mg) by oral route once daily   Active, Disp: , Rfl:     meloxicam (MOBIC) 7.5 MG tablet, Take 1 tablet by mouth Daily., Disp: 90 tablet, Rfl: 0    potassium gluconate ([employer] Potassium Gluconate) 595 (99 K) MG tablet tablet, Take 650 mg by mouth Daily., Disp: , Rfl:     valACYclovir (VALTREX) 500 MG tablet, TAKE 1 TABLET BY MOUTH EVERY DAY, Disp: 90 tablet, Rfl: 1    omeprazole (priLOSEC) 20 MG capsule, Take 1 capsule by mouth Daily., Disp: 90 capsule, Rfl: 1    Allergies:  Allergies   Allergen Reactions    Hydrocodone GI Intolerance and Nausea And Vomiting       Family Hx:  Family History   Problem Relation Age  "of Onset    Arthritis Mother     Diabetes Mother     Hyperlipidemia Mother     Heart disease Father     Hyperlipidemia Father     Diabetes Maternal Grandfather     Stroke Maternal Grandfather     Diabetes Maternal Grandmother     Cancer Brother     Cancer Paternal Aunt     COPD Sister         Social History:  Social History     Socioeconomic History    Marital status:    Tobacco Use    Smoking status: Every Day     Current packs/day: 1.00     Average packs/day: 1 pack/day for 51.3 years (51.3 ttl pk-yrs)     Types: Cigarettes     Start date: 1974     Passive exposure: Current    Smokeless tobacco: Never   Vaping Use    Vaping status: Never Used   Substance and Sexual Activity    Alcohol use: Not Currently     Alcohol/week: 1.0 standard drink of alcohol     Types: 1 Glasses of wine per week     Comment: Haven’t really drank alcohol since about 2020    Drug use: Never    Sexual activity: Not Currently     Partners: Male     Birth control/protection: Tubal ligation       Review of Systems  Review of Systems   Constitutional:  Negative for chills, diaphoresis, fatigue and fever.   HENT:  Negative for congestion and sore throat.    Respiratory:  Negative for cough.    Cardiovascular:  Negative for chest pain.   Gastrointestinal:  Negative for abdominal pain, nausea and vomiting.   Genitourinary:  Negative for dysuria.   Musculoskeletal:  Negative for myalgias and neck pain.   Skin:  Negative for rash.   Neurological:  Negative for weakness, numbness and headaches.       Objective:     /76 (BP Location: Left arm, Patient Position: Sitting, Cuff Size: Adult)   Pulse 78   Ht 160 cm (62.99\")   Wt 49.3 kg (108 lb 9.6 oz)   SpO2 100%   BMI 19.24 kg/m²   Physical Exam  Constitutional:       General: She is not in acute distress.     Appearance: Normal appearance. She is not ill-appearing, toxic-appearing or diaphoretic.   Pulmonary:      Effort: Pulmonary effort is normal. No respiratory distress. "   Neurological:      Mental Status: She is alert.   Psychiatric:         Mood and Affect: Mood normal.         Behavior: Behavior normal.          Assessment/Plan:     Diagnoses and all orders for this visit:    1. Medication monitoring encounter (Primary)  2.  Onychomycosis  We have previously prescribed topical ciclopirox for onychomycosis of the toenails bilaterally.  I do note some improvement on my exam but patient denies having had significant response to treatment.  She expresses preference to attempt oral medication.  Will start terbinafine 250 mg for approximately 3 months due to location.  However, before starting this medication would want to confirm she still has normal liver enzymes.  Will order hepatic function panel today and anticipate starting medication if this is normal we will follow-up in 3 months.      -     Hepatic Function Panel; Future    2. Hip arthritis    She request refill of Mobic for arthritis.  Due to the length of treatment I think perhaps we should start her on PPI as well for acid suppression.    -     meloxicam (MOBIC) 7.5 MG tablet; Take 1 tablet by mouth Daily.  Dispense: 30 tablet; Refill: 0  -     omeprazole (priLOSEC) 20 MG capsule; Take 1 capsule by mouth Daily.  Dispense: 90 capsule; Refill: 1    3. Skin lesion    She has had skin lesions of the hands bilaterally which had responded to topical steroid but not resolved.  Would refer to dermatology at her request today.    -     Ambulatory Referral to Dermatology        Follow-up:     Return in about 3 months (around 7/28/2025) for Oncychomycosis .    Preventative:  Health Maintenance   Topic Date Due    LUNG CANCER SCREENING  07/19/2025    ANNUAL PHYSICAL  06/11/2025    INFLUENZA VACCINE  07/01/2025    MAMMOGRAM  07/19/2026    COLORECTAL CANCER SCREENING  06/18/2027    PAP SMEAR  07/23/2027    TDAP/TD VACCINES (2 - Td or Tdap) 02/03/2030    HEPATITIS C SCREENING  Completed    COVID-19 Vaccine  Completed    Pneumococcal  Vaccine 50+  Completed    ZOSTER VACCINE  Completed           This document has been electronically signed by Jose F Villarreal MD on April 28, 2025 10:33 EDT       Parts of this note are electronic transcriptions/translations of spoken language to printed text using the Dragon Dictation system.

## 2025-04-29 ENCOUNTER — TELEPHONE (OUTPATIENT)
Dept: FAMILY MEDICINE CLINIC | Facility: CLINIC | Age: 65
End: 2025-04-29
Payer: COMMERCIAL

## 2025-04-29 DIAGNOSIS — B35.1 ONYCHOMYCOSIS: Primary | ICD-10-CM

## 2025-04-29 RX ORDER — TERBINAFINE HYDROCHLORIDE 250 MG/1
250 TABLET ORAL DAILY
Qty: 84 TABLET | Refills: 0 | Status: SHIPPED | OUTPATIENT
Start: 2025-04-29 | End: 2025-07-22

## 2025-04-29 NOTE — TELEPHONE ENCOUNTER
Pt called back regarding lab results. Relayed results to pt with no further questions at this time.

## 2025-06-05 DIAGNOSIS — Z91.89 AT RISK FOR HEART DISEASE: ICD-10-CM

## 2025-06-05 DIAGNOSIS — B00.1 HERPES LABIALIS: ICD-10-CM

## 2025-06-05 RX ORDER — VALACYCLOVIR HYDROCHLORIDE 500 MG/1
500 TABLET, FILM COATED ORAL DAILY
Qty: 90 TABLET | Refills: 1 | Status: SHIPPED | OUTPATIENT
Start: 2025-06-05

## 2025-06-05 RX ORDER — ATORVASTATIN CALCIUM 10 MG/1
10 TABLET, FILM COATED ORAL DAILY
Qty: 90 TABLET | Refills: 1 | Status: SHIPPED | OUTPATIENT
Start: 2025-06-05

## 2025-07-19 DIAGNOSIS — B35.1 ONYCHOMYCOSIS: ICD-10-CM

## 2025-07-21 RX ORDER — TERBINAFINE HYDROCHLORIDE 250 MG/1
250 TABLET ORAL DAILY
Qty: 84 TABLET | Refills: 0 | OUTPATIENT
Start: 2025-07-21

## 2025-07-21 NOTE — TELEPHONE ENCOUNTER
Patient has completed course.  Due for follow-up.  Will discontinue and decline refill today.      This document has been electronically signed by Jose F Villarreal MD on July 21, 2025 09:28 EDT

## 2025-07-28 ENCOUNTER — OFFICE VISIT (OUTPATIENT)
Dept: FAMILY MEDICINE CLINIC | Facility: CLINIC | Age: 65
End: 2025-07-28
Payer: COMMERCIAL

## 2025-07-28 VITALS
DIASTOLIC BLOOD PRESSURE: 73 MMHG | SYSTOLIC BLOOD PRESSURE: 119 MMHG | HEART RATE: 65 BPM | HEIGHT: 63 IN | BODY MASS INDEX: 19.14 KG/M2 | WEIGHT: 108 LBS | OXYGEN SATURATION: 100 %

## 2025-07-28 DIAGNOSIS — L29.9 PRURITUS: Primary | ICD-10-CM

## 2025-07-28 DIAGNOSIS — Z87.891 PERSONAL HISTORY OF NICOTINE DEPENDENCE: ICD-10-CM

## 2025-07-28 DIAGNOSIS — M16.10 HIP ARTHRITIS: ICD-10-CM

## 2025-07-28 PROCEDURE — 99214 OFFICE O/P EST MOD 30 MIN: CPT | Performed by: STUDENT IN AN ORGANIZED HEALTH CARE EDUCATION/TRAINING PROGRAM

## 2025-07-28 RX ORDER — DIAPER,BRIEF,INFANT-TODD,DISP
1 EACH MISCELLANEOUS 2 TIMES DAILY
Qty: 15 G | Refills: 0 | Status: SHIPPED | OUTPATIENT
Start: 2025-07-28

## 2025-07-28 RX ORDER — MELOXICAM 7.5 MG/1
7.5 TABLET ORAL DAILY
Qty: 90 TABLET | Refills: 0 | Status: SHIPPED | OUTPATIENT
Start: 2025-07-28

## 2025-07-28 NOTE — PROGRESS NOTES
Subjective:       Romina Hopper is a 65 y.o. female with a concurrent medical history of current everyday smoking, allergic rhinitis, herpes labialis, retinal migraines and osteoarthritis of the hips who presents for follow-up for onychomycosis.      Romina has history of onychomycosis of the toenails.  We have attempted treatment with topical ciclopirox without success and so prescribed terbinafine 250 mg for approximately 3 months.  Today, she returns and states symptoms have nearly resolved. Treatment was successful. Medicine has been discontinued.     Romina has had itching of the back and scalp. Will use selsun blue 2.5% for scalp and two weeks of topical steroid on back - she is to notify me if symptoms fail to resolve.        Past Medical Hx:  Past Medical History:   Diagnosis Date    Allergic 09/1997    Anemia 07/2000    I was a little anemic after i had my last child    Headache     Have always gotten headache on a pretty regular basis    Hip arthrosis 05/22       Past Surgical Hx:  Past Surgical History:   Procedure Laterality Date    TUBAL ABDOMINAL LIGATION         Current Meds:    Current Outpatient Medications:     atorvastatin (LIPITOR) 10 MG tablet, TAKE 1 TABLET BY MOUTH EVERY DAY, Disp: 90 tablet, Rfl: 1    Bacillus Coagulans-Inulin (Probiotic) 1-250 BILLION-MG capsule, Take 50 Billion Cells by mouth Daily., Disp: , Rfl:     loratadine (Claritin) 10 MG tablet, Claritin 10 mg oral tablet take 1 tablet (10 mg) by oral route once daily   Active, Disp: , Rfl:     meloxicam (MOBIC) 7.5 MG tablet, Take 1 tablet by mouth Daily., Disp: 90 tablet, Rfl: 0    omeprazole (priLOSEC) 20 MG capsule, Take 1 capsule by mouth Daily., Disp: 90 capsule, Rfl: 1    potassium gluconate ([employer] Potassium Gluconate) 595 (99 K) MG tablet tablet, Take 650 mg by mouth Daily., Disp: , Rfl:     valACYclovir (VALTREX) 500 MG tablet, TAKE 1 TABLET BY MOUTH EVERY DAY, Disp: 90 tablet, Rfl: 1    ciclopirox (PENLAC) 8 % solution,  "APPLY TOPICALLY TO THE APPROPRIATE AREA AS DIRECTED EVERY NIGHT. (Patient not taking: Reported on 7/28/2025), Disp: 6.6 mL, Rfl: 2    hydrocortisone 0.5 % cream, Apply 1 Application topically to the appropriate area as directed 2 (Two) Times a Day., Disp: 15 g, Rfl: 0    Allergies:  Allergies   Allergen Reactions    Hydrocodone GI Intolerance and Nausea And Vomiting       Family Hx:  Family History   Problem Relation Age of Onset    Arthritis Mother     Diabetes Mother     Hyperlipidemia Mother     Rheumatologic disease Mother     Heart disease Father     Hyperlipidemia Father     Diabetes Maternal Grandfather     Stroke Maternal Grandfather     Diabetes Maternal Grandmother     Arthritis Maternal Grandmother     Cancer Brother     Cancer Paternal Aunt     COPD Sister     Asthma Daughter         Out grew it by the age of 6        Social History:  Social History     Socioeconomic History    Marital status:    Tobacco Use    Smoking status: Every Day     Current packs/day: 1.00     Average packs/day: 1 pack/day for 51.6 years (51.6 ttl pk-yrs)     Types: Cigarettes     Start date: 1974     Passive exposure: Current    Smokeless tobacco: Never    Tobacco comments:     Have smoked since i was 14   Vaping Use    Vaping status: Never Used   Substance and Sexual Activity    Alcohol use: Not Currently     Alcohol/week: 1.0 standard drink of alcohol     Types: 1 Glasses of wine per week     Comment: Haven’t really drank alcohol since about 2020    Drug use: Never    Sexual activity: Not Currently     Partners: Male     Birth control/protection: Tubal ligation       Review of Systems  Review of Systems   Skin:         +itchiness        Objective:     /73 (BP Location: Left arm, Patient Position: Sitting, Cuff Size: Adult)   Pulse 65   Ht 160 cm (62.99\")   Wt 49 kg (108 lb)   SpO2 100%   BMI 19.14 kg/m²   Physical Exam  Constitutional:       General: She is not in acute distress.     Appearance: Normal " appearance. She is not ill-appearing, toxic-appearing or diaphoretic.   Pulmonary:      Effort: Pulmonary effort is normal. No respiratory distress.   Neurological:      Mental Status: She is alert.   Psychiatric:         Mood and Affect: Mood normal.         Behavior: Behavior normal.          Assessment/Plan:     Diagnoses and all orders for this visit:    1. Pruritus (Primary)  2. Personal history of nicotine dependence    Romina has had itching of the back and scalp. Will use selsun blue 2.5% for scalp and two weeks of topical steroid on back - she is to notify me if symptoms fail to resolve.    -     hydrocortisone 0.5 % cream; Apply 1 Application topically to the appropriate area as directed 2 (Two) Times a Day.  Dispense: 15 g; Refill: 0  -     CT Chest Low Dose Follow Up Without Contrast; Future    3. Onychomycosis       Romina has history of onychomycosis of the toenails.  We have attempted treatment with topical ciclopirox without success and so prescribed terbinafine 250 mg for approximately 3 months.  Today, she returns and states symptoms have nearly resolved. Treatment was successful. Medicine has been discontinued.       Follow-up:     Return in about 2 months (around 9/28/2025) for Annual physical.    Preventative:  Health Maintenance   Topic Date Due    ANNUAL PHYSICAL  06/11/2025    LUNG CANCER SCREENING  07/19/2025    INFLUENZA VACCINE  10/01/2025    MAMMOGRAM  07/19/2026    COLORECTAL CANCER SCREENING  06/18/2027    PAP SMEAR  07/23/2027    TDAP/TD VACCINES (2 - Td or Tdap) 02/03/2030    HEPATITIS C SCREENING  Completed    COVID-19 Vaccine  Completed    Pneumococcal Vaccine 50+  Completed    ZOSTER VACCINE  Completed         This document has been electronically signed by Jose F Villarreal MD on July 28, 2025 11:13 EDT       Parts of this note are electronic transcriptions/translations of spoken language to printed text using the Dragon Dictation system.

## 2025-08-14 DIAGNOSIS — Z87.891 PERSONAL HISTORY OF NICOTINE DEPENDENCE: Primary | ICD-10-CM

## 2025-08-19 ENCOUNTER — TELEPHONE (OUTPATIENT)
Dept: FAMILY MEDICINE CLINIC | Facility: CLINIC | Age: 65
End: 2025-08-19
Payer: COMMERCIAL

## 2025-08-25 ENCOUNTER — OFFICE VISIT (OUTPATIENT)
Dept: FAMILY MEDICINE CLINIC | Facility: CLINIC | Age: 65
End: 2025-08-25
Payer: COMMERCIAL

## 2025-08-25 VITALS
DIASTOLIC BLOOD PRESSURE: 81 MMHG | OXYGEN SATURATION: 99 % | BODY MASS INDEX: 18.78 KG/M2 | HEART RATE: 66 BPM | SYSTOLIC BLOOD PRESSURE: 122 MMHG | HEIGHT: 63 IN | WEIGHT: 106 LBS

## 2025-08-25 DIAGNOSIS — Z00.00 WELL ADULT EXAM: Primary | ICD-10-CM

## 2025-08-25 DIAGNOSIS — Z87.891 PERSONAL HISTORY OF NICOTINE DEPENDENCE: ICD-10-CM

## 2025-08-25 DIAGNOSIS — Z13.220 LIPID SCREENING: ICD-10-CM

## 2025-08-25 PROCEDURE — 99396 PREV VISIT EST AGE 40-64: CPT | Performed by: FAMILY MEDICINE
